# Patient Record
Sex: MALE | Race: BLACK OR AFRICAN AMERICAN | Employment: FULL TIME | URBAN - METROPOLITAN AREA
[De-identification: names, ages, dates, MRNs, and addresses within clinical notes are randomized per-mention and may not be internally consistent; named-entity substitution may affect disease eponyms.]

---

## 2022-09-21 ENCOUNTER — HOSPITAL ENCOUNTER (OUTPATIENT)
Dept: PHYSICAL THERAPY | Age: 57
Discharge: HOME | End: 2022-09-21
Payer: COMMERCIAL

## 2022-09-21 PROCEDURE — 97161 PT EVAL LOW COMPLEX 20 MIN: CPT

## 2022-09-21 NOTE — PROGRESS NOTES
[address] [facility] PHYSICAL THERAPY AT [place]  [address] [facility] [address] [facility], [place], [address] [facility] - Phone: [phone]  Fax: [phone] / [address] [facility]  Patient Name: Elbert Junior : 1965   Medical   Diagnosis: Other low back pain [M54.59] Treatment Diagnosis: LBP;  Lx radic   Onset Date: 2022     Referral Source: Luz Ramirez, DO Start of Care [facility]): 2022   Prior Hospitalization: See medical history Provider #: [id number]   Prior Level of Function: No back pain with ADLs/ work activity   Comorbidities: Diabetes, heart disease, HTN   Medications: Verified on Patient Summary List   The Plan of Care and following information is based on the information from the initial evaluation.   ================================================================  Assessment / key information: Elbert Junior is a 56 y.o.  yo male with Dx of Other low back pain [M54.59]/ Left Lx radiculopathy. He reports waking 1 morning in 2022 with significant L buttock/ hip pain that radiated to the left calf. He sought medical consultation and was provided a steroid dose pack which helped. Current symptoms are described as constant achiness from the left buttock to the left calf. Pain is worse ant night and in the morning. On assessment, Mr. Jacquelin Contreras sits with slouched posture. Lx ROM is WNLs except R rotation with is 75% and painful. LE strength and ROM are WNLs except thighness in B hamstrings and limited L hip ER. SLR test is positive on the left. Repeated movement tests suggests directional preference for extension with centralization of radicular symptoms.   FOTO score= 44%.  ================================================================  Eval Complexity: History LOW Complexity : Zero comorbidities / personal factors that will impact the outcome / POC;  Examination  HIGH Complexity : 4+ Standardized tests and measures addressing body structure, function, activity limitation and / or participation in recreation ; Presentation MEDIUM Complexity : Evolving with changing characteristics ; Decision Making MEDIUM Complexity : FOTO score of 26-74; Overall Complexity LOW   Problem List: pain affecting function, decrease ROM, impaired gait/ balance, decrease ADL/ functional abilitiies, decrease activity tolerance, and decrease flexibility/ joint mobility   Treatment Plan may include any combination of the following: Therapeutic exercise, Therapeutic activities, Neuromuscular re-education, Physical agent/modality, Gait/balance training, Manual therapy, and Patient education  Patient / Family readiness to learn indicated by: asking questions, trying to perform skills, and interest  Persons(s) to be included in education: patient (P)  Barriers to Learning/Limitations: None  Measures taken, if barriers to learning:    Patient Goal (s): Get rid of pain   Patient self reported health status: fair  Rehabilitation Potential: good  Short Term Goals: To be accomplished in  2  weeks:  1 Patient will report >= 25% improvement in symptoms with ADLs. 2 Patient will be educated in extension progression principles to alleviate symptoms. 3 Patient will report centralization of symptoms. Long Term Goals: To be accomplished in  4-6  weeks:  1 Patient to report >= 70% improvement in symptoms with ADLs. 2 Patient will be independent with finalized HEP/ self maintenance. 3 Increase FOTO score >=  59% to indicate improved function with use of LS.  4 Fully abolish radicular symptoms > 1 week duration. 5 Restore full AROM for improved ADL participation.     Frequency / Duration:   Patient to be seen  2  times per week for 4-6  weeks:  Patient / Caregiver education and instruction: self care, activity modification, and exercises    Therapist Signature: Julita Arthur PT Date: 4/55/2830   Certification Period:  Time: 4:37 PM ===================================================================  I certify that the above Physical Therapy Services are being furnished while the patient is under my care. I agree with the treatment plan and certify that this therapy is necessary. To ensure your patient receives the highest quality care and to avoid disruption in therapy please sign and return this plan of care within 21 days. Per Medicaid guidelines if the plan of care is not received within 21 days the patient's care must be put on hold until signed. Physician Signature:        Date:       Time:        Karan Orantes DO  Please sign and return to [facility] or you may fax the signed copy to [phone]. Thank you.

## 2022-09-21 NOTE — PROGRESS NOTES
PHYSICAL THERAPY - DAILY TREATMENT NOTE     Patient Name: Bailey Lr        Date: 2022  : 1965   YES Patient  Verified  Visit #:   1     Insurance: Payor: Fayetta Hamman / Plan: Desire Vyas / Product Type: HMO /      In time: 400 Out time: 435   Total Treatment Time: 35     Medicare/BCBS Time Tracking (below)   Total Timed Codes (min):  - 1:1 Treatment Time:  -     TREATMENT AREA =  Other low back pain [M54.59]    SUBJECTIVE    Pain Level (on 0 to 10 scale):  5   10   Medication Changes/New allergies or changes in medical history, any new surgeries or procedures?     NO    If yes, update Summary List   Subjective Functional Status/Changes:  []  No changes reported     See eval /POC         OBJECTIVE  Modalities Rationale:     decrease pain to improve patient's ability to return to PLOF      min [] Estim, type/location:                                      []  att     []  unatt     []  w/US     []  w/ice    []  w/heat    min []  Mechanical Traction: type/lbs                   []  pro   []  sup   []  int   []  cont    []  before manual    []  after manual    min []  Ultrasound, settings/location:      min []  Iontophoresis w/ dexamethasone, location:                                               []  take home patch       []  in clinic    min []  Ice     []  Heat    location/position:     min []  Vasopneumatic Device, press/temp:     min []  Other:    [] Skin assessment post-treatment (if applicable):    []  intact    []  redness- no adverse reaction     []redness - adverse reaction:      5/ nc min Therapeutic Exercise:  [x]  See flow sheet   Rationale:      increase ROM and increase strength to improve the patients ability to return to PLOF      min Manual Therapy: Technique:      [] S/DTM []IASTM []PROM [] Passive Stretching   []manual TPR    []Jt manipulation:Gr I [] II []  III [] IV[]  []REIL with manual OP  Treatment Area:     Rationale:      decrease pain, increase ROM, increase tissue extensibility and decrease trigger points to improve patient's ability to return to PLOF     min Neuromuscular Re-ed: [x]  See flow sheet   Rationale:      improve coordination, improve balance, increase proprioception and dec dizziness to improve the patients ability to return to PLOF       5/ nc min Self Care: Ext principle ed   Rationale:    increase ROM, increase strength and improve coordination to improve the patients ability to return to PLOF    Billed With/As:   [] TE   [] TA   [] Neuro   [] Self Care Patient Education: [x] Review HEP    [] Progressed/Changed HEP based on:   [] positioning   [] body mechanics   [] transfers   [] heat/ice application    [] other:        Other Objective/Functional Measures:    See eval/ POC     Post Treatment Pain Level (on 0 to 10) scale:   5 (centralization)  / 10     ASSESSMENT    X  See POC     PLAN    [x]  Upgrade activities as tolerated {YES) Continue plan of care   []  Discharge due to :    []  Other:      Therapist: Deni Sung PT    Date: 9/21/2022 Time: 4:35 PM   No future appointments.

## 2022-09-27 ENCOUNTER — TELEPHONE (OUTPATIENT)
Dept: PHYSICAL THERAPY | Age: 57
End: 2022-09-27

## 2022-10-04 ENCOUNTER — HOSPITAL ENCOUNTER (OUTPATIENT)
Dept: PHYSICAL THERAPY | Age: 57
Discharge: HOME | End: 2022-10-04
Payer: COMMERCIAL

## 2022-10-04 PROCEDURE — 97110 THERAPEUTIC EXERCISES: CPT

## 2022-10-04 PROCEDURE — 97112 NEUROMUSCULAR REEDUCATION: CPT

## 2022-10-04 PROCEDURE — 97530 THERAPEUTIC ACTIVITIES: CPT

## 2022-10-04 NOTE — PROGRESS NOTES
PHYSICAL THERAPY - DAILY TREATMENT NOTE     Patient Name: Pete Palencia        Date: 10/4/2022  : 1965   YES Patient  Verified  Visit #:   2   of     Insurance: Payor: Onofre Brown / Plan: Kennedy Gil / Product Type: HMO /      In time: 4:12 Out time: 5: 12 pm   Total Treatment Time: 60     Medicare/[facility] Time Tracking (below)   Total Timed Codes (min):  50 1:1 Treatment Time:  40     TREATMENT AREA =  Other low back pain [M54.59]    SUBJECTIVE    Pain Level (on 0 to 10 scale):  3  / 10_LB, L groin intermittent L shin   Medication Changes/New allergies or changes in medical history, any new surgeries or procedures?     NO    If yes, update Summary List   Subjective Functional Status/Changes:  []  No changes reported     Pain is quite a bit better with the ex  Pt reports he has noticed pain in R LE -intermittently        OBJECTIVE  Modalities Rationale:     decrease edema, decrease inflammation, decrease pain, and increase tissue extensibility to improve patient's ability to return to PLOF   min [] Estim, type/location:                                      []  att     []  unatt     []  w/US     []  w/ice    []  w/heat    min []  Mechanical Traction: type/lbs                   []  pro   []  sup   []  int   []  cont    []  before manual    []  after manual    min []  Ultrasound, settings/location:      min []  Iontophoresis w/ dexamethasone, location:                                               []  take home patch       []  in clinic   10 min [x]  Ice     []  Heat    location/position: Prone lying LB    min []  Vasopneumatic Device, press/temp:    If using vaso (only need to measure limb vaso being performed on)      pre-treatment girth :       post-treatment girth :       measured at (landmark location) :      min []  Other:    [x] Skin assessment post-treatment (if applicable):    [x]  intact    []  redness- no adverse reaction                  []redness - adverse reaction:      15 min Therapeutic Exercise:  [x]  See flow sheet   Rationale:      increase ROM, increase strength, improve coordination, and increase proprioception to improve the patients ability to Return to PLOF     20 min Therapeutic Activity: [x]  Pt education in sitting positioning review with use of towel roll, sleeping positioning, sit to stand, activity modification and self monitoring of sx for pain management; self monitoring of centralization vs radicular sx in ADLs   Rationale:      increase ROM, increase strength, improve coordination, and increase proprioception to improve the patients ability to return to PLOF     15 min Neuromuscular Re-ed: [x]  See flow sheet   Rationale:      increase ROM, increase strength, improve coordination, and increase proprioception to improve the patients ability to return to PLOF     Billed With/As:   [] TE   [] TA   [] Neuro   [] Self Care Patient Education: [x] Review HEP    [] Progressed/Changed HEP based on:   [] positioning   [] body mechanics   [] transfers   [] heat/ice application    [] other:        Other Objective/Functional Measures:    Updated written HEP     Post Treatment Pain Level (on 0 to 10) scale:   0  / 10     ASSESSMENT    Assessment/Changes in Function:     Pt able to abolish sx in standing with extension based positioning and exercise  Moderate tightness L hip ER     []  See Progress Note/Recertification   Patient will continue to benefit from skilled PT services to modify and progress therapeutic interventions, address functional mobility deficits, address ROM deficits, address strength deficits, analyze and address soft tissue restrictions, analyze and cue movement patterns, analyze and modify body mechanics/ergonomics, assess and modify postural abnormalities, and instruct in home and community integration to attain remaining goals.       Progress toward goals / Updated goals:    Good Progress to    [x] STG    [] LTG  good pain relief with current HEP       PLAN    [x] Upgrade activities as tolerated YES Continue plan of care   []  Discharge due to :    []  Other:      Therapist: Carmen Castillo PTA    Date: 10/4/2022 Time: 5:12PM     Future Appointments   Date Time Provider Lisa Means   10/6/2022  5:10 PM Yamilet Buenrostro PT [facility]

## 2022-10-06 ENCOUNTER — HOSPITAL ENCOUNTER (OUTPATIENT)
Dept: PHYSICAL THERAPY | Age: 57
Discharge: HOME | End: 2022-10-06
Payer: COMMERCIAL

## 2022-10-06 PROCEDURE — 97112 NEUROMUSCULAR REEDUCATION: CPT

## 2022-10-06 PROCEDURE — 97110 THERAPEUTIC EXERCISES: CPT

## 2022-10-06 PROCEDURE — 97530 THERAPEUTIC ACTIVITIES: CPT

## 2022-10-06 NOTE — PROGRESS NOTES
PHYSICAL THERAPY - DAILY TREATMENT NOTE     Patient Name: Martínez Benites        Date: 10/6/2022  : 1965   YES Patient  Verified  Visit #:   3   of     Insurance: Payor: Ajay Pradhan / Plan: Bizible / Product Type: HMO /      In time: 510 Out time: 550   Total Treatment Time: 40     Medicare/[facility] Time Tracking (below)   Total Timed Codes (min):  40 1:1 Treatment Time:  40     TREATMENT AREA =  Other low back pain [M54.59]    SUBJECTIVE    Pain Level (on 0 to 10 scale):  2  / 10   Medication Changes/New allergies or changes in medical history, any new surgeries or procedures? NO    If yes, update Summary List   Subjective Functional Status/Changes:  []  No changes reported     Doing better. Ex's help    Had incident where pain went down leg this morning in bathroom. Current pain in L LB and L inner thigh. Pain not sharp anymore, more achy         OBJECTIVE      10 min Therapeutic Exercise:  [x]  See flow sheet   Rationale:      increase ROM and increase strength to improve the patients ability to perform ADLs with less pain     15 min Neuromuscular Re-ed: [x]  See flow sheet   Rationale:      improve coordination and dec neural compromise  to improve the patients ability to perform ADLs with less pain     15 min Therapeutic Activity: [x]  See flow sheet   Rationale:      improve coordination and improve posture, improve body mechanics  to improve the patients ability to perform ADLs with less pain       Billed With/As:   [x] TE   [x] TA   [x] Neuro   [] Self Care Patient Education: [x] Review HEP    [] Progressed/Changed HEP based on:   [] positioning   [] body mechanics   [] transfers   [] heat/ice application    [] other:        Other Objective/Functional Measures:    Sign ed on HEP frequency.  (Currently performing 2x/day)    Able to abolish pain after REIL    Pt ed on extensin principle relative to disc pathology     Pt ed on posture, ADLs- sittinf, sit to stand, lift relative to disc pathology   Post Treatment Pain Level (on 0 to 10) scale:    0/10     ASSESSMENT    Assessment/Changes in Function:     Pt reports iproved ease of donning shoes/ socks     []  See Progress Note/Recertification   Patient will continue to benefit from skilled PT services to modify and progress therapeutic interventions, address functional mobility deficits, address ROM deficits, address strength deficits, analyze and address soft tissue restrictions, analyze and cue movement patterns, and analyze and modify body mechanics/ergonomics to attain remaining goals.       Progress toward goals / Updated goals:    Good Progress to    [] STG    [] LTG  1 as shown by pt report    Fair progress towards LTG 2 due to limited freq of performing HEP       []  See Progress Note/Recertification    PLAN    [x]  Upgrade activities as tolerated {YES) Continue plan of care   []  Discharge due to :    []  Other:      Therapist: Mary Anne Ayala PT    Date: 10/6/2022 Time: 5:09 PM     Future Appointments   Date Time Provider Lisa Means   10/6/2022  5:10 PM Bettie Cook, PT [facility]

## 2022-10-11 ENCOUNTER — HOSPITAL ENCOUNTER (OUTPATIENT)
Dept: PHYSICAL THERAPY | Age: 57
Discharge: HOME | End: 2022-10-11
Payer: COMMERCIAL

## 2022-10-11 PROCEDURE — 97530 THERAPEUTIC ACTIVITIES: CPT

## 2022-10-11 PROCEDURE — 97110 THERAPEUTIC EXERCISES: CPT

## 2022-10-11 NOTE — PROGRESS NOTES
PHYSICAL THERAPY - DAILY TREATMENT NOTE     Patient Name: Renetta Harrison        Date: 10/11/2022  : 1965   YES Patient  Verified  Visit #:     Insurance: Payor: Chase Deng / Plan: Favian Upton / Product Type: HMO /      In time: 4:10 pm Out time: 4:52   Total Treatment Time: 42     Medicare/BCBS Time Tracking (below)   Total Timed Codes (min):  32 1:1 Treatment Time:  32     TREATMENT AREA =  Other low back pain [M54.59]    SUBJECTIVE    Pain Level (on 0 to 10 scale):  1  / 10   Medication Changes/New allergies or changes in medical history, any new surgeries or procedures? NO    If yes, update Summary List   Subjective Functional Status/Changes:  []  No changes reported     Pt reports work is challenging because he has been trying to watch how he if lifting.    Decreasing frequency in LLE radicular sx to 2x/day         OBJECTIVE  Modalities Rationale:     decrease edema, decrease inflammation, decrease pain, and increase tissue extensibility to improve patient's ability to perform lifting with less pain   min [] Estim, type/location:                                      []  att     []  unatt     []  w/US     []  w/ice    []  w/heat    min []  Mechanical Traction: type/lbs                   []  pro   []  sup   []  int   []  cont    []  before manual    []  after manual    min []  Ultrasound, settings/location:      min []  Iontophoresis w/ dexamethasone, location:                                               []  take home patch       []  in clinic   10 min [x]  Ice     []  Heat    location/position: Prone low back    min []  Vasopneumatic Device, press/temp:    If using vaso (only need to measure limb vaso being performed on)      pre-treatment girth :       post-treatment girth :       measured at (landmark location) :      min []  Other:    [x] Skin assessment post-treatment (if applicable):    [x]  intact    []  redness- no adverse reaction                  []redness - adverse reaction:      12 min Therapeutic Exercise:  [x]  See flow sheet   Rationale:      increase ROM, increase strength, improve coordination, and increase proprioception to improve the patients ability to perform lifting with less pain     12 min Therapeutic Activity: [x]  Lifting body mechanics with 10# box waist to floor, floor to shoulder ; sitting hip hinge, sit to stand body mechanics   Rationale:      increase ROM, increase strength, improve coordination, and increase proprioception to improve the patients ability to perform lifting with less pain      8 min Neuromuscular Re-ed: [x]  See flow sheet   Rationale:      increase ROM, increase strength, improve coordination, and increase proprioception to improve the patients ability to perform lifting with less pain          Billed With/As:   [] TE   [] TA   [] Neuro   [] Self Care Patient Education: [x] Review HEP    [] Progressed/Changed HEP based on:   [] positioning   [] body mechanics   [] transfers   [] heat/ice application    [] other:        Other Objective/Functional Measures:    Prone trunk AROM ~ 70%     Post Treatment Pain Level (on 0 to 10) scale:   0  / 10     ASSESSMENT    Assessment/Changes in Function:     Pt demonstrating good use of hip hinge with lifting mechanics with VCs     []  See Progress Note/Recertification   Patient will continue to benefit from skilled PT services to modify and progress therapeutic interventions, address functional mobility deficits, address ROM deficits, address strength deficits, analyze and address soft tissue restrictions, analyze and cue movement patterns, analyze and modify body mechanics/ergonomics, assess and modify postural abnormalities, and instruct in home and community integration to attain remaining goals.       Progress toward goals / Updated goals:    Good Progress to    [x] STG    [] LTG  2 as shown by good centralization of sx with extension based positioning and exercise       PLAN    [x]  Upgrade activities as tolerated YES Continue plan of care   []  Discharge due to :    []  Other:      Therapist: Liat Lamas PTA    Date: 10/11/2022 Time: 4:52 PM     Future Appointments   Date Time Provider Lisa Means   10/13/2022  4:00 PM Elena Shelton PTA [facility]   10/18/2022  4:40 PM Elena Shelton PTA [facility]   10/20/2022  4:40 PM Elena Shelton PTA [facility]

## 2022-10-13 ENCOUNTER — HOSPITAL ENCOUNTER (OUTPATIENT)
Dept: PHYSICAL THERAPY | Age: 57
Discharge: HOME | End: 2022-10-13
Payer: COMMERCIAL

## 2022-10-13 PROCEDURE — 97530 THERAPEUTIC ACTIVITIES: CPT

## 2022-10-13 PROCEDURE — 97112 NEUROMUSCULAR REEDUCATION: CPT

## 2022-10-13 PROCEDURE — 97110 THERAPEUTIC EXERCISES: CPT

## 2022-10-13 NOTE — PROGRESS NOTES
PHYSICAL THERAPY - DAILY TREATMENT NOTE     Patient Name: Candace Trejo        Date: 10/13/2022  : 1965   YES Patient  Verified  Visit #:      12  Insurance: Payor: Jennifer Gaitan / Plan: Linda Yo / Product Type: HMO /      In time: 4:05  Out time: 4:56    Total Treatment Time: 51     Medicare/[employer]BS Time Tracking (below)   Total Timed Codes (min):  41 1:1 Treatment Time:  40     TREATMENT AREA =  Other low back pain [M54.59]    SUBJECTIVE    Pain Level (on 0 to 10 scale):  0  / 10   Medication Changes/New allergies or changes in medical history, any new surgeries or procedures?     NO    If yes, update Summary List   Subjective Functional Status/Changes:  []  No changes reported     Pt reports increased low back pain this morning after driving ~ 30 min into work   No LLE radicular sx ~ 1 week         OBJECTIVE  Modalities Rationale:     decrease inflammation, decrease pain, and increase tissue extensibility to improve patient's ability to perform ADLs with less pain   min [] Estim, type/location:                                      []  att     []  unatt     []  w/US     []  w/ice    []  w/heat    min []  Mechanical Traction: type/lbs                   []  pro   []  sup   []  int   []  cont    []  before manual    []  after manual    min []  Ultrasound, settings/location:      min []  Iontophoresis w/ dexamethasone, location:                                               []  take home patch       []  in clinic   10 min [x]  Ice     []  Heat    location/position: Prone low back    min []  Vasopneumatic Device, press/temp:    If using vaso (only need to measure limb vaso being performed on)      pre-treatment girth :       post-treatment girth :       measured at (landmark location) :      min []  Other:    [] Skin assessment post-treatment (if applicable):    [x]  intact    []  redness- no adverse reaction                  []redness - adverse reaction:      14 min Therapeutic Exercise:  [x]  See flow sheet   Rationale:      increase ROM, increase strength, improve coordination, and increase proprioception to improve the patients ability to perform ADLs with less pain     11 min Therapeutic Activity: [x]  See flow sheet   Rationale:      increase ROM, increase strength, improve coordination, and increase proprioception to improve the patients ability to perform bending , sit to stand, lifting     16 min Neuromuscular Re-ed: [x]  See flow sheet   Rationale:      increase ROM, increase strength, improve coordination, and increase proprioception to improve the patients ability to perform ADLs with less pain         Billed With/As:   [] TE   [] TA   [] Neuro   [] Self Care Patient Education: [x] Review HEP    [] Progressed/Changed HEP based on:   [] positioning   [] body mechanics   [] transfers   [] heat/ice application    [] other:        Other Objective/Functional Measures:    Updated written HEP     Post Treatment Pain Level (on 0 to 10) scale:   0  / 10     ASSESSMENT    Assessment/Changes in Function:     Advanced core stabilization, decreasing muscle guarding with positioning changes  Prone trunk AROM ~80%     []  See Progress Note/Recertification   Patient will continue to benefit from skilled PT services to modify and progress therapeutic interventions, address functional mobility deficits, address ROM deficits, address strength deficits, analyze and address soft tissue restrictions, analyze and cue movement patterns, analyze and modify body mechanics/ergonomics, assess and modify postural abnormalities, and instruct in home and community integration to attain remaining goals.       Progress toward goals / Updated goals:    Good Progress to    [x] STG    [] LTG  1 as shown by good compliance in HEP; good pain relief with current program       PLAN    [x]  Upgrade activities as tolerated YES Continue plan of care   []  Discharge due to :    []  Other:      Therapist: Colten Child PTA    Date: 10/13/2022 Time: 4:56 PM     Future Appointments   Date Time Provider Lisa Means   10/18/2022  4:40 PM Elena Shelton PTA [facility]   10/20/2022  4:40 PM Elena Shelton PTA [facility]

## 2022-10-18 ENCOUNTER — HOSPITAL ENCOUNTER (OUTPATIENT)
Dept: PHYSICAL THERAPY | Age: 57
Discharge: HOME | End: 2022-10-18
Payer: COMMERCIAL

## 2022-10-18 PROCEDURE — 97530 THERAPEUTIC ACTIVITIES: CPT

## 2022-10-18 PROCEDURE — 97110 THERAPEUTIC EXERCISES: CPT

## 2022-10-20 ENCOUNTER — APPOINTMENT (OUTPATIENT)
Dept: PHYSICAL THERAPY | Age: 57
End: 2022-10-20
Payer: COMMERCIAL

## 2022-10-21 ENCOUNTER — APPOINTMENT (OUTPATIENT)
Dept: PHYSICAL THERAPY | Age: 57
End: 2022-10-21
Payer: COMMERCIAL

## 2022-10-24 ENCOUNTER — HOSPITAL ENCOUNTER (OUTPATIENT)
Dept: PHYSICAL THERAPY | Age: 57
Discharge: HOME | End: 2022-10-24
Payer: COMMERCIAL

## 2022-10-24 PROCEDURE — 97530 THERAPEUTIC ACTIVITIES: CPT

## 2022-10-24 PROCEDURE — 97110 THERAPEUTIC EXERCISES: CPT

## 2022-10-24 NOTE — PROGRESS NOTES
[address] [facility] PHYSICAL THERAPY AT [facility] [address]. [place], [address]  Phone: [phone]  Fax: [phone]  PROGRESS NOTE  Patient Name: Hector Street : 1965   Treatment/Medical Diagnosis: Other low back pain [M54.59]   Referral Source: Vidhya Dhaliwal DO     Date of Initial Visit: 22 Attended Visits: 7 Missed Visits: 3     SUMMARY OF TREATMENT  Treatment focused on lumbar extension biased protocol to reduced left sided lumbar radiculopathy. CURRENT STATUS  Patient reporting 50% improvement overall. Significant reduction in radiculopathy over last few week with spinal extension protocol. However, patient fell asleep over weekend in partial-reclined with increased lumbar flexion causing exacerbation of symptoms. Symptoms have since reduced to 3/10. Plan to continue with extension based therex and patient education on avoidance of flexion and prolonged sitting. Patient to follow up with MD on 22 and planning to follow up regarding scheduling of lumbar MRI. Previous Goals:  1. Patient to report >= 70% improvement in symptoms with ADLs. 2. Patient will increase FOTO score >=  59% to indicate improved function with use of LS. 3. Patient will fully abolish radicular symptoms > 1 week duration. 4. Patient will restore full AROM for improved ADL participation. Prior Level/Current Level:  1) Prior Level: n/a    Current Level: 50% improvement   Goal Met? no  2) Prior Level: 44 points   Current Level: 52 points   Goal Met? no  3) Prior Level: daily radiculopathy   Current Level: 50% improvement in symptoms   Goal Met? no  4) Prior Level: no active lumbar flexion   Current Level: no active lumbar flexion   Goal Met? no    New Goals to be achieved in __4__  weeks. Patient will report 75% improvement in left lumbar radiculopathy to improve tolerance to work and ADLs.   Patient will increase FOTO Score to 59 points to improve tolerance [place] says she is still using medihoney on her cat bite, it is down to 1/4 inch. It was still having spots of blood, middle of wound bed was pale pink, then there was a ring of red around the wound, then skin. She skipped the medihoney yesterday and today. Now the wound appears to have a bit of a scab. She thinks the wound looks better and is now questioning if she should stop the medihoney all together, she questions if she should have stopped sooner .    to work and ADLs. Patient will demonstrate independence in HEP to maintain current functional level. RECOMMENDATIONS  Recommend continued PT 2 x week x 4 weeks to further reduce left lumbar radiculopathy. If you have any questions/comments please contact us directly at [phone]. Thank you for allowing us to assist in the care of your patient. Therapist Signature: Oj Hidalgo PT Date: 10/24/2022     Time: 5:02 PM   NOTE TO PHYSICIAN:  PLEASE COMPLETE THE ORDERS BELOW AND FAX TO   [facility]: [phone]. If you are unable to process this request in 24 hours please contact our office: [phone].    ___ I have read the above report and request that my patient continue as recommended.   ___ I have read the above report and request that my patient continue therapy with the following changes/special instructions:_________________________________________________________   ___ I have read the above report and request that my patient be discharged from therapy.      Physician Signature:        Date:       Time:

## 2022-10-24 NOTE — PROGRESS NOTES
PHYSICAL THERAPY - DAILY TREATMENT NOTE  8-    Patient Name: Jackie Robles        Date: 10/24/2022  : 1965   YES Patient  Verified  Visit #:     Insurance: Payor: Patience Cruz / Plan: Julieth Navas / Product Type: HMO /      In time: 4:50 Out time: 5:30   Total Treatment Time: 40     Medicare/BCBS Time Tracking (below)   Total Timed Codes (min):  30 1:1 Treatment Time:  30     TREATMENT AREA =  Other low back pain [M54.59]    SUBJECTIVE    Pain Level (on 0 to 10 scale):  3  / 10   Medication Changes/New allergies or changes in medical history, any new surgeries or procedures?     NO    If yes, update Summary List   Subjective Functional Status/Changes:  []  No changes reported       See PN         OBJECTIVE  Modalities Rationale:     decrease pain to improve patient's ability to work and perform ADLs   min [] Estim, type/location:                                      []  att     []  unatt     []  w/US     []  w/ice    []  w/heat    min []  Mechanical Traction: type/lbs                   []  pro   []  sup   []  int   []  cont    []  before manual    []  after manual    min []  Ultrasound, settings/location:      min []  Iontophoresis w/ dexamethasone, location:                                               []  take home patch       []  in clinic   10 min [x]  Ice     []  Heat    location/position: Prone lumbar    min []  Vasopneumatic Device, press/temp:        min []  Other:    [x] Skin assessment post-treatment (if applicable):    [x]  intact    []  redness- no adverse reaction                  []redness - adverse reaction:      15 min Therapeutic Exercise:  [x]  See flow sheet   Rationale:       centralize symptoms  to improve the patients ability to perform work and ADLs     10 min Therapeutic Activity: [x]  Floor to waist lifting body mechanics; BET sit to stand, hip hinge, use of towel roll for neutral spine posture   Rationale:      increase ROM, increase strength, improve coordination, and increase proprioception to improve the patients ability to  perform lifting with less pain      5 min Manual Therapy: Technique:      [] S/DTM []IASTM []PROM [] Passive Stretching   []manual TPR    []Jt manipulation:Gr I [] II []  III [] IV[] V[]  Treatment Area:  STM lumbar and left piriformis; overpressure with lumbar repeated extension in prone   Rationale:      decrease pain and centralize symptoms  to improve patient's ability to perform work and ADLs    Billed With/As:   [x] TE   [] TA   [] Neuro   [] Self Care Patient Education: [x] Review HEP    [] Progressed/Changed HEP based on:   [] positioning   [] body mechanics   [] transfers   [] heat/ice application    [] other:      Other Objective/Functional Measures:    See PN     Post Treatment Pain Level (on 0 to 10) scale:   1  / 10     ASSESSMENT    Assessment/Changes in Function:     See PN     []  See Progress Note/Recertification   Patient will continue to benefit from skilled PT services to modify and progress therapeutic interventions, address functional mobility deficits, address ROM deficits, address strength deficits, analyze and address soft tissue restrictions, analyze and cue movement patterns, analyze and modify body mechanics/ergonomics, and assess and modify postural abnormalities to attain remaining goals. to attain remaining goals. Progress toward goals / Updated goals:    See PN     PLAN    [x]  Upgrade activities as tolerated YES Continue plan of care   []  Discharge due to :    []  Other:      Therapist: Brian Henry, PT    Date: 10/24/2022 Time: 5:02 PM   No future appointments.

## 2022-10-25 ENCOUNTER — HOSPITAL ENCOUNTER (OUTPATIENT)
Dept: PHYSICAL THERAPY | Age: 57
Discharge: HOME | End: 2022-10-25
Payer: COMMERCIAL

## 2022-10-25 PROCEDURE — 97530 THERAPEUTIC ACTIVITIES: CPT

## 2022-10-25 PROCEDURE — 97110 THERAPEUTIC EXERCISES: CPT

## 2022-10-25 NOTE — PROGRESS NOTES
PHYSICAL THERAPY - DAILY TREATMENT NOTE     Patient Name: Olegario Gupta        Date: 10/25/2022  : 1965   YES Patient  Verified  Visit #:   8   of   12  Insurance: Payor: Marylin Perea / Plan: [employer] [place] / Product Type: HMO /      In time: 4:50 pm Out time: 5:50 pm   Total Treatment Time: 60     Medicare/[facility] Time Tracking (below)   Total Timed Codes (min):  50  1:1 Treatment Time:  23     TREATMENT AREA =  Other low back pain [M54.59]    SUBJECTIVE    Pain Level (on 0 to 10 scale):  2  / 10   Medication Changes/New allergies or changes in medical history, any new surgeries or procedures? NO    If yes, update Summary List   Subjective Functional Status/Changes:  []  No changes reported     Pt reports no radicular sx . Feeling better since over the weekend when he fell asleep sitting up.  Pt reports intermittent cramping in feet   Pain relief with HEP         OBJECTIVE  Modalities Rationale:     decrease edema, decrease inflammation, decrease pain, and increase tissue extensibility to improve patient's ability to perform ADLs with less pain   min [] Estim, type/location:                                      []  att     []  unatt     []  w/US     []  w/ice    []  w/heat    min []  Mechanical Traction: type/lbs                   []  pro   []  sup   []  int   []  cont    []  before manual    []  after manual    min []  Ultrasound, settings/location:      min []  Iontophoresis w/ dexamethasone, location:                                               []  take home patch       []  in clinic   10 min [x]  Ice     []  Heat    location/position: Prone low back    min []  Vasopneumatic Device, press/temp:    If using vaso (only need to measure limb vaso being performed on)      pre-treatment girth :       post-treatment girth :       measured at (landmark location) :      min []  Other:    [x] Skin assessment post-treatment (if applicable):    [x]  intact    []  redness- no adverse reaction []redness - adverse reaction:      24 min Therapeutic Exercise:  [x]  See flow sheet   Rationale:      increase ROM, increase strength, improve coordination, and increase proprioception to improve the patients ability to perform ADLs with less pain     12 min Therapeutic Activity: [x]  See flow sheet   Rationale:      increase ROM, increase strength, improve coordination, and increase proprioception to improve the patients ability to perform ADLs with less pain     9 min Neuromuscular Re-ed: [x]  See flow sheet   Rationale:      increase ROM, increase strength, improve coordination, and increase proprioception to improve the patients ability to perform ADLs with less pain      5 min Manual Therapy: Technique:      Prone DTM to lower lumbar paraspinals, L>R piriformis release  Treatment Area:     Rationale:      decrease pain, increase ROM, and increase tissue extensibility to improve patient's ability to improve tissue mobility in ADLs  The manual therapy interventions were performed at a separate and distinct time from the therapeutic activities interventions. Billed With/As:   [] TE   [] TA   [] Neuro   [] Self Care Patient Education: [x] Review HEP    [] Progressed/Changed HEP based on:   [] positioning   [] body mechanics   [] transfers   [] heat/ice application    [] other:        Other Objective/Functional Measures:     Add tband rows, ext     Post Treatment Pain Level (on 0 to 10) scale:   2  / 10     ASSESSMENT    Assessment/Changes in Function:     Pt reporting intermittent ms cramping in feet/ legs after tband exercises; decreased ex per flow sheet; prone trunk AROM ~ 50 %     []  See Progress Note/Recertification   Patient will continue to benefit from skilled PT services to modify and progress therapeutic interventions, address functional mobility deficits, address ROM deficits, address strength deficits, analyze and address soft tissue restrictions, analyze and cue movement patterns, analyze and modify body mechanics/ergonomics, and assess and modify postural abnormalities to attain remaining goals.       Progress toward goals / Updated goals:    Continue toward all updated goals       PLAN    [x]  Upgrade activities as tolerated YES Continue plan of care   []  Discharge due to :    []  Other:      Therapist: Cinthia Cuello PTA    Date: 10/25/2022 Time: 5:50 PM     Future Appointments   Date Time Provider Lisa Means   10/26/2022  4:45 PM [facility]   11/1/2022  4:00 PM Magdy Messer PTA [facility]   11/2/2022  4:45 PM Jenn Gilbert PT [facility]

## 2022-10-26 ENCOUNTER — HOSPITAL ENCOUNTER (OUTPATIENT)
Dept: PHYSICAL THERAPY | Age: 57
Discharge: HOME | End: 2022-10-26
Payer: COMMERCIAL

## 2022-10-26 PROCEDURE — 97110 THERAPEUTIC EXERCISES: CPT

## 2022-10-26 PROCEDURE — 97530 THERAPEUTIC ACTIVITIES: CPT

## 2022-10-26 PROCEDURE — 97140 MANUAL THERAPY 1/> REGIONS: CPT

## 2022-10-26 NOTE — PROGRESS NOTES
PHYSICAL THERAPY - DAILY TREATMENT NOTE    Patient Name: Rene Miles        Date: 10/26/2022  : 1965   yes Patient  Verified  Visit #:     Insurance: Payor: Rachael Tadeo / Plan: Hafsa Hernández / Product Type: HMO /      In time: 450 Out time: 540   Total Treatment Time: 50     Medicare/BCBS Time Tracking (below)   Total Timed Codes (min):  40 1:1 Treatment Time:  40     TREATMENT AREA =  Other low back pain [M54.59]    SUBJECTIVE  Pain Level (on 0 to 10 scale):  2  / 10 (feels tired)    Medication Changes/New allergies or changes in medical history, any new surgeries or procedures?    no  If yes, update Summary List   Subjective Functional Status/Changes:  []  No changes reported     Not doing too bad today, back just feels a little tired. No radicular sx today and denies cramping into the feet today.            OBJECTIVE  Modalities Rationale:     decrease edema, decrease inflammation, decrease pain, and increase tissue extensibility to improve patient's ability to perform ADLs with less pain                min [] Estim, type/location:                                                            []  att     []  unatt     []  w/US     []  w/ice    []  w/heat     min []  Mechanical Traction: type/lbs                    []  pro   []  sup   []  int   []  cont    []  before manual    []  after manual     min []  Ultrasound, settings/location:        min []  Iontophoresis w/ dexamethasone, location:                                                []  take home patch       []  in clinic   10 min [x]  Ice     []  Heat    location/position: Prone low back     min []  Vasopneumatic Device, press/temp:     If using vaso (only need to measure limb vaso being performed on)      pre-treatment girth :       post-treatment girth :       measured at (landmark location) :       min []  Other:     [x] Skin assessment post-treatment (if applicable):    [x]  intact    []  redness- no adverse reaction []redness - adverse reaction:      17 min Therapeutic Exercise:  [x]  See flow sheet   Rationale:      increase ROM, increase strength, improve coordination, and increase proprioception to improve the patients ability to perform ADLs with less pain      15 min Therapeutic Activity: [x]  See flow sheet   Rationale:      increase ROM, increase strength, improve coordination, and increase proprioception to improve the patients ability to perform ADLs with less pain      8 min Manual Therapy: Technique:      Prone DTM to lower lumbar paraspinals, L>R piriformis release  Treatment Area:     Rationale:      decrease pain, increase ROM, and increase tissue extensibility to improve patient's ability to improve tissue mobility in ADLs  The manual therapy interventions were performed at a separate and distinct time from the therapeutic activities interventions. Billed With/As:   [x] TE   [] TA   [] Neuro   [] Self Care Patient Education: [x] Review HEP    [] Progressed/Changed HEP based on:   [] positioning   [] body mechanics   [] transfers   [] heat/ice application    [] other:      Other Objective/Functional Measures:    Continued with therex, functional activities and NMR per flow sheet    Denies cramping into LE today     No pain post treatment      Post Treatment Pain Level (on 0 to 10) scale:   0  / 10     ASSESSMENT  Assessment/Changes in Function:     Pt reports no cramping in LE today, cued for TA activation during standing rows and extensions. Decent hinge / dead lift mechanics noted today, slight cueing to keep weight close to body. Will progress as able.       []  See Progress Note/Recertification   Patient will continue to benefit from skilled PT services to modify and progress therapeutic interventions, address functional mobility deficits, address ROM deficits, address strength deficits, analyze and address soft tissue restrictions, analyze and cue movement patterns, analyze and modify body mechanics/ergonomics, and assess and modify postural abnormalities to attain remaining goals.    Progress toward goals / Updated goals:    Continue toward all updated goals        PLAN  [x]  Upgrade activities as tolerated yes Continue plan of care   []  Discharge due to :    []  Other:      Therapist: Brian Kim PT    Date: 10/26/2022 Time: 4:45 PM     Future Appointments   Date Time Provider Lisa Means   11/1/2022  4:00 PM Kim Pepe, PTA [facility]   11/2/2022  4:45 PM Magui Seymour, PT [facility]

## 2022-11-01 ENCOUNTER — HOSPITAL ENCOUNTER (OUTPATIENT)
Dept: PHYSICAL THERAPY | Age: 57
Discharge: HOME | End: 2022-11-01
Payer: COMMERCIAL

## 2022-11-01 PROCEDURE — 97110 THERAPEUTIC EXERCISES: CPT

## 2022-11-01 PROCEDURE — 97530 THERAPEUTIC ACTIVITIES: CPT

## 2022-11-01 PROCEDURE — 97140 MANUAL THERAPY 1/> REGIONS: CPT

## 2022-11-01 NOTE — PROGRESS NOTES
PHYSICAL THERAPY - DAILY TREATMENT NOTE     Patient Name: Gregorio aSlas        Date: 2022  : 1965   YES Patient  Verified  Visit #:   10   of   12  Insurance: Payor: Philly Ace / Plan: Silvia Stack / Product Type: HMO /      In time: 4:07 pm Out time: 5:18   Total Treatment Time: 71     Medicare/BCBS Time Tracking (below)   Total Timed Codes (min):  56 1:1 Treatment Time:  38     TREATMENT AREA =  Other low back pain [M54.59]    SUBJECTIVE    Pain Level (on 0 to 10 scale): 2-3  / 10-LB   Medication Changes/New allergies or changes in medical history, any new surgeries or procedures?     NO    If yes, update Summary List   Subjective Functional Status/Changes:  []  No changes reported     Pt reports pain in am and after work  Pt reporting ~80% overall improvement         OBJECTIVE  Modalities Rationale:     decrease edema, decrease inflammation, decrease pain, and increase tissue extensibility to improve patient's ability to perform lifting and bending   min [] Estim, type/location:                                      []  att     []  unatt     []  w/US     []  w/ice    []  w/heat    min []  Mechanical Traction: type/lbs                   []  pro   []  sup   []  int   []  cont    []  before manual    []  after manual    min []  Ultrasound, settings/location:      min []  Iontophoresis w/ dexamethasone, location:                                               []  take home patch       []  in clinic   10 min [x]  Ice     []  Heat    location/position: Prone low back    min []  Vasopneumatic Device, press/temp:    If using vaso (only need to measure limb vaso being performed on)      pre-treatment girth :       post-treatment girth :       measured at (landmark location) :      min []  Other:    [x] Skin assessment post-treatment (if applicable):    [x]  intact    []  redness- no adverse reaction                  []redness - adverse reaction:      31 min Therapeutic Exercise:  [x]  See flow sheet Rationale:      increase ROM, increase strength, improve coordination, and increase proprioception to improve the patients ability to  perform lifting and bending     10 min Therapeutic Activity: [x]  See flow sheet   Rationale:      increase ROM, increase strength, improve coordination, and increase proprioception to improve the patients ability to  perform lifting and bending       16 min Manual Therapy: Technique:      [x] S/DTM []IASTM []PROM [] Passive Stretching   [x]manual TPR    []Jt manipulation:Gr I [] II []  III [] IV[] V[]  Treatment Area:  prone Lumbar; piriformis   Rationale:      decrease pain, increase ROM, increase tissue extensibility, and decrease trigger points to improve patient's ability to improve tissue mobility in ADLs  The manual therapy interventions were performed at a separate and distinct time from the therapeutic activities interventions. Billed With/As:   [] TE   [] TA   [] Neuro   [] Self Care Patient Education: [x] Review HEP    [] Progressed/Changed HEP based on:   [] positioning   [] body mechanics   [] transfers   [] heat/ice application    [] other:        Other Objective/Functional Measures:    Review lifting body mechanics     Post Treatment Pain Level (on 0 to 10) scale:   1  / 10     ASSESSMENT    Assessment/Changes in Function:     Improving hip AROM ER L>R  Emphasized self stretching, positioning in HEP.  Review proper core engeagment with use of deep breathing for abdominal  isolation  Pt education in use of hip hinge in 10# box lifting from  knee to waist; waist to shoulder     []  See Progress Note/Recertification   Patient will continue to benefit from skilled PT services to modify and progress therapeutic interventions, address functional mobility deficits, address ROM deficits, address strength deficits, analyze and address soft tissue restrictions, analyze and cue movement patterns, analyze and modify body mechanics/ergonomics, assess and modify postural abnormalities, and instruct in home and community integration to attain remaining goals.       Progress toward goals / Updated goals:    Good Progress to    [] STG    [x] LTG  2 as shown by improving use of hip hinge with sit to stand , lifting, and tap backs with VCs       PLAN    [x]  Upgrade activities as tolerated YES Continue plan of care   []  Discharge due to :    []  Other:      Therapist: MARTY Culver, PT, DPT, Jez 64    Date: 11/1/2022 Time:  5:18 PM     Future Appointments   Date Time Provider Lisa Means   11/2/2022  4:45 PM Jes Blanton PT [facility]

## 2022-11-02 ENCOUNTER — APPOINTMENT (OUTPATIENT)
Dept: PHYSICAL THERAPY | Age: 57
End: 2022-11-02
Payer: COMMERCIAL

## 2022-11-08 ENCOUNTER — HOSPITAL ENCOUNTER (OUTPATIENT)
Dept: PHYSICAL THERAPY | Age: 57
Discharge: HOME | End: 2022-11-08
Payer: COMMERCIAL

## 2022-11-08 PROCEDURE — 97530 THERAPEUTIC ACTIVITIES: CPT

## 2022-11-08 PROCEDURE — 97110 THERAPEUTIC EXERCISES: CPT

## 2022-11-08 NOTE — PROGRESS NOTES
PHYSICAL THERAPY - DAILY TREATMENT NOTE     Patient Name: Hector Street        Date: 2022  : 1965   YES Patient  Verified  Visit #:     Insurance: Payor: Jennifer Trotter / Plan: Srinivas Reddy / Product Type: HMO /      In time: 6:00 pm Out time: 6:56 pm   Total Treatment Time: 56     Medicare/BCBS Time Tracking (below)   Total Timed Codes (min):  46 1:1 Treatment Time:  38     TREATMENT AREA =  Other low back pain [M54.59]    SUBJECTIVE    Pain Level (on 0 to 10 scale):  3  / 10- low back   Medication Changes/New allergies or changes in medical history, any new surgeries or procedures?     NO    If yes, update Summary List   Subjective Functional Status/Changes:  []  No changes reported       No radicular pain ~ 2 weeks  Pt reporting ~ 80% overall improvement  Pain average: 2-3         OBJECTIVE  Modalities Rationale:     decrease edema, decrease inflammation, decrease pain, and increase tissue extensibility to improve patient's ability to perform ADLs with less pain   min [] Estim, type/location:                                      []  att     []  unatt     []  w/US     []  w/ice    []  w/heat    min []  Mechanical Traction: type/lbs                   []  pro   []  sup   []  int   []  cont    []  before manual    []  after manual    min []  Ultrasound, settings/location:      min []  Iontophoresis w/ dexamethasone, location:                                               []  take home patch       []  in clinic   10 min [x]  Ice     []  Heat    location/position: Prone low back    min []  Vasopneumatic Device, press/temp:    If using vaso (only need to measure limb vaso being performed on)      pre-treatment girth :       post-treatment girth :       measured at (landmark location) :      min []  Other:    [x] Skin assessment post-treatment (if applicable):    [x]  intact    []  redness- no adverse reaction                  []redness - adverse reaction:      36 min Therapeutic Exercise: [x]  See flow sheet   Rationale:      increase ROM and improve coordination to improve the patients ability to perform ADLs with less pain     10 min Therapeutic Activity: [x]  See flow sheet   Rationale:      increase ROM, increase strength, improve coordination, improve balance, and increase proprioception to improve the patients ability to perform lifting , pushing , pulling         Billed With/As:   [] TE   [] TA   [] Neuro   [] Self Care Patient Education: [x] Review HEP    [] Progressed/Changed HEP based on:   [] positioning   [] body mechanics   [] transfers   [] heat/ice application    [] other:        Other Objective/Functional Measures:    Discussed discharge planning     Post Treatment Pain Level (on 0 to 10) scale:   0  / 10     ASSESSMENT    Assessment/Changes in Function:     Pt education in push pull body mechanics with hip sled  Prone trunk AROM: ~ 65%      []  See Progress Note/Recertification   Patient will continue to benefit from skilled PT services to modify and progress therapeutic interventions, address functional mobility deficits, address ROM deficits, address strength deficits, analyze and address soft tissue restrictions, analyze and cue movement patterns, analyze and modify body mechanics/ergonomics, assess and modify postural abnormalities, and instruct in home and community integration to attain remaining goals.       Progress toward goals / Updated goals:    Good Progress to    [] STG    [x] LTG  reassess for possible discharge to HEP       PLAN    [x]  Upgrade activities as tolerated YES Continue plan of care   []  Discharge due to :    []  Other:      Therapist: Chau Tejada PTA    Date: 11/8/2022 Time: 6:56 PM     Future Appointments   Date Time Provider Lisa Means   11/15/2022  4:00 PM Sunni Lira PTA [facility]

## 2022-11-15 ENCOUNTER — HOSPITAL ENCOUNTER (OUTPATIENT)
Dept: PHYSICAL THERAPY | Age: 57
Discharge: HOME | End: 2022-11-15
Payer: COMMERCIAL

## 2022-11-15 PROCEDURE — 97110 THERAPEUTIC EXERCISES: CPT

## 2022-11-15 PROCEDURE — 97530 THERAPEUTIC ACTIVITIES: CPT

## 2022-11-15 PROCEDURE — 97112 NEUROMUSCULAR REEDUCATION: CPT

## 2022-11-15 NOTE — PROGRESS NOTES
[address] [facility] PHYSICAL THERAPY AT [facility] [address] [place], [address]  Phone: [phone]  Fax: [phone] SUMMARY  Patient Name: Renetta Harrison : 1965   Treatment/Medical Diagnosis: Other low back pain [M54.59]   Referral Source: Sruthi Lion DO     Date of Initial Visit: 2022 Attended Visits: 12 Missed Visits: 3     SUMMARY OF TREATMENT  Physical therapy treatment has consisted of Therapeutic exercise for lumbar stabilzation and extension based exercise, Therapeutic Activity for pt education in body mechanics, positioning, and lifting techniques, Manual therapy, and ice. CURRENT STATUS  Patient has progressed well in Physical Therapy, consistently reporting improving ROM, decreasing pain, and increased functional ability. Pt's current pain range is 0 to 1/10. Functional improvements are no radicular sx L LE ~ 3-4 weeks,   Lumbar AROM: FF: 90% (distal shin), ext: 85%, RSB: 80%; LSB: 75% ; pain free all ranges  FOTO score improved from 44 @ IE to 94 indicating improving functional mobility. Pt reporting +7 GROC a very great deal better. Goal/Measure of Progress Goal Met? 1. Patient will report 75% improvement in left lumbar radiculopathy to improve tolerance to work and ADLs. Status at last Eval: 50% improvement Current Status: 90% overall improvement yes   2. Patient will increase FOTO Score to 59 points to improve tolerance to work and ADLs. Status at last Eval: 52  Current Status: 94 yes   3. Patient will demonstrate independence in HEP to maintain current functional level. Status at last Eval: Pt instructed in initial HEP Current Status: Pt I in D/C HEP yes     RECOMMENDATIONS  Discontinue therapy. Progressing towards or have reached established goals. Unless otherwise indicated. If you have any questions/comments please contact us directly at [phone].    Thank you for allowing us to assist in the care of your patient.     Therapist Signature: Alicia Dawson PTA Date: 11-   Reporting Period:   N/a Time: 4:08 PM

## 2022-11-15 NOTE — PROGRESS NOTES
PHYSICAL THERAPY - DAILY TREATMENT NOTE     Patient Name: Amanda Yao        Date: 11/15/2022  : 1965   YES Patient  Verified  Visit #:     Insurance: Payor: Mariana Dixon / Plan: Deondre Chol / Product Type: HMO /      In time: 4:02 pm Out time: 4:46    Total Treatment Time: 44     Medicare/[facility] Time Tracking (below)   Total Timed Codes (min):  44 1:1 Treatment Time:  39     TREATMENT AREA =  Other low back pain [M54.59]    SUBJECTIVE    Pain Level (on 0 to 10 scale):  1  / 10   Medication Changes/New allergies or changes in medical history, any new surgeries or procedures? NO    If yes, update Summary List   Subjective Functional Status/Changes:  []  No changes reported   Pt reports pain range~ 1/10          OBJECTIVE  Modalities Rationale:  n/a       19 min Therapeutic Exercise:  [x]  See flow sheet   Rationale:      increase ROM, increase strength, improve coordination, and increase proprioception to improve the patients ability to perform ADLs with less pain      10 min Therapeutic Activity: [x]  See flow sheet   Rationale:      increase ROM, increase strength, improve coordination, and increase proprioception to improve the patients ability to perform ADLs with less pain      15 min Neuromuscular Re-ed: [x]  See flow sheet   Rationale:      increase ROM, increase strength, improve coordination, and increase proprioception to improve the patients ability to perform ADLs with less pain         Billed With/As:   [] TE   [] TA   [] Neuro   [] Self Care Patient Education: [x] Review HEP    [] Progressed/Changed HEP based on:   [] positioning   [] body mechanics   [] transfers   [] heat/ice application    [] other:        Other Objective/Functional Measures:     Add restorative flexion stretches per flow sheet  Review D/C instructions and HEP     Post Treatment Pain Level (on 0 to 10) scale:   0  / 10     ASSESSMENT    Assessment/Changes in Function:     FOTO: 94     [x]  See Progress Note/Recertification   Patient will continue to benefit from skilled PT services to modify and progress therapeutic interventions, address functional mobility deficits, address ROM deficits, address strength deficits, analyze and address soft tissue restrictions, analyze and cue movement patterns, analyze and modify body mechanics/ergonomics, assess and modify postural abnormalities, and instruct in home and community integration to attain remaining goals. Progress toward goals / Updated goals:  See discharge        PLAN    []  Upgrade activities as tolerated NO Continue plan of care   [x]  Discharge due to : Plan to D/C to HEP unless otherwise indicated by MD @ f/u on 11/21/2022     []  Other:      Therapist: Zoila Carrington PTA    Date: 11/15/2022 Time:  4:46 PM   No future appointments.

## 2024-01-29 ENCOUNTER — HOSPITAL ENCOUNTER (OUTPATIENT)
Facility: HOSPITAL | Age: 59
Setting detail: RECURRING SERIES
Discharge: HOME | End: 2024-02-01
Payer: MEDICAID

## 2024-01-29 PROCEDURE — 97161 PT EVAL LOW COMPLEX 20 MIN: CPT

## 2024-01-29 NOTE — PROGRESS NOTES
PHYSICAL / OCCUPATIONAL THERAPY - DAILY TREATMENT NOTE (updated )    Patient Name: Edgard Adams    Date: 2024    : 1965  Insurance: Payor: [facility] MEDICAID / Plan: [employer]IDIA [facility] HEALTHKEEPERS PLUS / Product Type: *No Product type* /      Patient  verified Yes   Visit #   Current / Total 1 12   Time   In / Out 3:00 3:40   Pain   In / Out 3/10 3/10   Subjective Functional Status/Changes: See Eval     TREATMENT AREA =  Other low back pain [M54.59]    OBJECTIVE         Therapeutic Procedures:  Tx Min Billable or 1:1 Min (if diff from Tx Min) Procedure, Rationale, Specifics   -  42699 Therapeutic Exercise (timed):  increase ROM, strength, coordination, balance, and proprioception to improve patient's ability to progress to PLOF and address remaining functional goals. (see flow sheet as applicable)     Details if applicable:         42412 Neuromuscular Re-Education (timed):  improve balance, coordination, kinesthetic sense, posture, core stability and proprioception to improve patient's ability to develop conscious control of individual muscles and awareness of position of extremities in order to progress to PLOF and address remaining functional goals. (see flow sheet as applicable)     Details if applicable:       68072 Manual Therapy (timed):  decrease pain, increase ROM, and increase tissue extensibility to improve patient's ability to progress to PLOF and address remaining functional goals.  The manual therapy interventions were performed at a separate and distinct time from the therapeutic activities interventions . (see flow sheet as applicable)     Details if applicable:       69177 Therapeutic Activity (timed):  use of dynamic activities replicating functional movements to increase ROM, strength, coordination, balance, and proprioception in order to improve patient's ability to progress to PLOF and address remaining functional goals.  (see flow sheet as applicable)     Details if

## 2024-01-29 NOTE — PROGRESS NOTES
[facility] [place]  [address], [address], [place] [address] - Phone: [phone]  Fax: [phone]  PLAN OF CARE / STATEMENT OF MEDICAL NECESSITY FOR PHYSICAL THERAPY SERVICES  Patient Name: Edgard Adams : 1965   Treatment   Diagnosis: M54.32  SCIATICA, LEFT SIDE  Medical Diagnosis: Other low back pain [M54.59]   Onset Date: 2023     Referral Source: Fidel Miles DO Start of Care (SOC): 2024   Prior Hospitalization: See medical history Provider #: [id number]   Prior Level of Function: Pt has chronic L/S pain with ADLs   Comorbidities: None reported    The Plan of Care and following information is based on the information from the initial evaluation.   ===========================================================================================  Patient is a 58 year old male that presents to PT with chief complaints of low back pain with radiating symptoms down the LLE that began in 2023 with no known mechanism of injury. Pt reports pain as 3-10/10, located in the L L/s and down the L LE. Pain today is rated as 3/10. Describes pain as aching  and intermittent in nature. Heat and meds makes the pain better. Moving the leg makes the pain worse. 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  Their primary goals in PT are to improve pain in order to take part in these activities of daily living.   Prior Diagnostic Tests: [] Lab work [] X-rays    [] CT [x] MRI     [] Other:  Results: do not have access to it currently\"  ==========================================================================  Objective Measures:    Symptoms:  Aggravated by:   [x] Bending [x] Sitting [] Standing [] Walking   [] Moving [] Cough [] Sneeze [] Valsalva   [] AM  [x] PM  Lying:  [] sup   [] pro   [] sidelying   [] Other: lifting         Posture:  Lateral

## 2024-02-02 ENCOUNTER — APPOINTMENT (OUTPATIENT)
Facility: HOSPITAL | Age: 59
End: 2024-02-02
Payer: MEDICAID

## 2024-02-06 ENCOUNTER — APPOINTMENT (OUTPATIENT)
Facility: HOSPITAL | Age: 59
End: 2024-02-06
Payer: MEDICAID

## 2024-02-09 ENCOUNTER — APPOINTMENT (OUTPATIENT)
Facility: HOSPITAL | Age: 59
End: 2024-02-09
Payer: MEDICAID

## 2024-02-13 ENCOUNTER — HOSPITAL ENCOUNTER (OUTPATIENT)
Facility: HOSPITAL | Age: 59
Setting detail: RECURRING SERIES
Discharge: HOME | End: 2024-02-16
Payer: MEDICAID

## 2024-02-13 PROCEDURE — 97110 THERAPEUTIC EXERCISES: CPT

## 2024-02-13 PROCEDURE — 97535 SELF CARE MNGMENT TRAINING: CPT

## 2024-02-13 PROCEDURE — 97112 NEUROMUSCULAR REEDUCATION: CPT

## 2024-02-13 NOTE — PROGRESS NOTES
PHYSICAL / OCCUPATIONAL THERAPY - DAILY TREATMENT NOTE    Patient Name: Edgard Adams    Date: 2024    : 1965  Insurance: Payor: [facility] MEDICAID / Plan: [employer]IDIA [facility] HEALTHKEEPERS PLUS / Product Type: *No Product type* /      Patient  verified Yes     Visit #   Current / Total 2 12   Time   In / Out 2:20 3:19   Pain   In / Out 3 0   Subjective Functional Status/Changes: Reports hasn't had pain into LE since he's been doing his exercises. Pain is in L lower back     TREATMENT AREA =  Other low back pain [M54.59]    OBJECTIVE    Ice (UNBILLED):  location/position: prone, L/s     Min Rationale   10 decrease inflammation and decrease pain to improve patient's ability to progress to PLOF and address remaining functional goals.     Skin assessment post-treatment:   Intact     Therapeutic Procedures:    21687 Therapeutic Exercise (timed):  increase ROM, strength, coordination, balance, and proprioception to improve patient's ability to progress to PLOF and address remaining functional goals.   Tx Min Billable or 1:1 Min   (if diff from Tx Min) Details:   25 15 See flow sheet as applicable     26095 Neuromuscular Re-Education (timed):  improve balance, coordination, kinesthetic sense, posture, core stability and proprioception to improve patient's ability to develop conscious control of individual muscles and awareness of position of extremities in order to progress to PLOF and address remaining functional goals.   Tx Min Billable or 1:1 Min   (if diff from Tx Min) Details:   15  See flow sheet as applicable     34940 Self Care/Home Management (timed):  improve patient knowledge and understanding of pain reducing techniques, positioning, and posture/ergonomics  to improve patient's ability to progress to PLOF and address remaining functional goals.    Tx Min Billable or 1:1 Min   (if diff from Tx Min) Details:   9  Discussion of pt functional status, positioning in truck, mobility        49 39

## 2024-02-15 ENCOUNTER — HOSPITAL ENCOUNTER (OUTPATIENT)
Facility: HOSPITAL | Age: 59
Setting detail: RECURRING SERIES
Discharge: HOME | End: 2024-02-18
Payer: MEDICAID

## 2024-02-15 PROCEDURE — 97112 NEUROMUSCULAR REEDUCATION: CPT

## 2024-02-15 PROCEDURE — 97535 SELF CARE MNGMENT TRAINING: CPT

## 2024-02-15 PROCEDURE — 97110 THERAPEUTIC EXERCISES: CPT

## 2024-02-15 NOTE — PROGRESS NOTES
PHYSICAL / OCCUPATIONAL THERAPY - DAILY TREATMENT NOTE    Patient Name: Edgard Adams    Date: 2/15/2024    : 1965  Insurance: Payor: [facility] MEDICAID / Plan: [employer]ID[employer] [facility] HEALTHKEEPERS PLUS / Product Type: *No Product type* /      Patient  verified Yes     Visit #   Current / Total 3 12   Time   In / Out 4:13 5:08   Pain   In / Out 2 0   Subjective Functional Status/Changes: Reports was feeling tired on both sides of his back earlier this afternoon.      TREATMENT AREA =  Other low back pain [M54.59]    OBJECTIVE    Ice (UNBILLED):  location/position: prone, L/s     Min Rationale   10 decrease inflammation and decrease pain to improve patient's ability to progress to PLOF and address remaining functional goals.     Skin assessment post-treatment:   Intact     Therapeutic Procedures:    76230 Therapeutic Exercise (timed):  increase ROM, strength, coordination, balance, and proprioception to improve patient's ability to progress to PLOF and address remaining functional goals.   Tx Min Billable or 1:1 Min   (if diff from Tx Min) Details:   20  See flow sheet as applicable     28087 Neuromuscular Re-Education (timed):  improve balance, coordination, kinesthetic sense, posture, core stability and proprioception to improve patient's ability to develop conscious control of individual muscles and awareness of position of extremities in order to progress to PLOF and address remaining functional goals.   Tx Min Billable or 1:1 Min   (if diff from Tx Min) Details:   17  See flow sheet as applicable     55260 Self Care/Home Management (timed):  improve patient knowledge and understanding of posture/ergonomics, diagnosis/prognosis, and physical therapy expectations, procedures and progression  to improve patient's ability to progress to PLOF and address remaining functional goals.    Tx Min Billable or 1:1 Min   (if diff from Tx Min) Details:   8  See flow sheet as applicable and ways to increase extension

## 2024-02-16 ENCOUNTER — APPOINTMENT (OUTPATIENT)
Facility: HOSPITAL | Age: 59
End: 2024-02-16
Payer: MEDICAID

## 2024-02-20 ENCOUNTER — HOSPITAL ENCOUNTER (OUTPATIENT)
Facility: HOSPITAL | Age: 59
Setting detail: RECURRING SERIES
Discharge: HOME | End: 2024-02-23
Payer: MEDICAID

## 2024-02-20 PROCEDURE — 97110 THERAPEUTIC EXERCISES: CPT

## 2024-02-20 PROCEDURE — 97535 SELF CARE MNGMENT TRAINING: CPT

## 2024-02-20 PROCEDURE — 97112 NEUROMUSCULAR REEDUCATION: CPT

## 2024-02-20 NOTE — PROGRESS NOTES
PHYSICAL / OCCUPATIONAL THERAPY - DAILY TREATMENT NOTE    Patient Name: Edgard Adams    Date: 2024    : 1965  Insurance: Payor: [facility] MEDICAID / Plan: [employer]IDIA [facility] HEALTHKEEPERS PLUS / Product Type: *No Product type* /      Patient  verified Yes     Visit #   Current / Total 4 8   Time   In / Out 2:23 3:19   Pain   In / Out 3 0   Subjective Functional Status/Changes: Back feels tired, usually gets tired by end of the day. Had stressful weekend. Has possible surgery on Friday for hernia surgery. Feeling much better since beginning PT. Doesn't have pain now or at work, just tired. Not taking any medication for pain.      TREATMENT AREA =  Other low back pain [M54.59]    OBJECTIVE    Ice (UNBILLED):  location/position: prone, L/s     Min Rationale   10 decrease inflammation and decrease pain to improve patient's ability to progress to PLOF and address remaining functional goals.     Skin assessment post-treatment:   Intact     Therapeutic Procedures:    83494 Therapeutic Exercise (timed):  increase ROM, strength, coordination, balance, and proprioception to improve patient's ability to progress to PLOF and address remaining functional goals.   Tx Min Billable or 1:1 Min   (if diff from Tx Min) Details:   20  See flow sheet as applicable     15216 Neuromuscular Re-Education (timed):  improve balance, coordination, kinesthetic sense, posture, core stability and proprioception to improve patient's ability to develop conscious control of individual muscles and awareness of position of extremities in order to progress to PLOF and address remaining functional goals.   Tx Min Billable or 1:1 Min   (if diff from Tx Min) Details:   16  See flow sheet as applicable     21416 Self Care/Home Management (timed):  improve patient knowledge and understanding of positioning, posture/ergonomics, diagnosis/prognosis, and physical therapy expectations, procedures and progression  to improve patient's ability

## 2024-02-23 ENCOUNTER — APPOINTMENT (OUTPATIENT)
Facility: HOSPITAL | Age: 59
End: 2024-02-23
Payer: MEDICAID

## 2024-02-27 ENCOUNTER — HOSPITAL ENCOUNTER (OUTPATIENT)
Facility: HOSPITAL | Age: 59
Setting detail: RECURRING SERIES
Discharge: HOME | End: 2024-03-01
Payer: MEDICAID

## 2024-02-27 PROCEDURE — 97530 THERAPEUTIC ACTIVITIES: CPT

## 2024-02-27 PROCEDURE — 97112 NEUROMUSCULAR REEDUCATION: CPT

## 2024-02-27 PROCEDURE — 97110 THERAPEUTIC EXERCISES: CPT

## 2024-02-27 NOTE — PROGRESS NOTES
PHYSICAL / OCCUPATIONAL THERAPY - DAILY TREATMENT NOTE    Patient Name: Edgrad Adams    Date: 2024    : 1965  Insurance: Payor: [facility] MEDICAID / Plan: NIDIA [facility] HEALTHKEEPERS PLUS / Product Type: *No Product type* /      Patient  verified Yes     Visit #   Current / Total 5 8   Time   In / Out 2:23 3:19   Pain   In / Out 0 0   Subjective Functional Status/Changes: Pt reports 100% improvement since beginning PT. Reports improvement in strength, improvement in energy, less pain, no pain into LEs, reports no issues doing job.  Reports going to be having hernia surgery on Thursday 3/14.      TREATMENT AREA =  Other low back pain [M54.59]    OBJECTIVE    Ice (UNBILLED):  location/position: prone, l/s     Min Rationale   10 decrease inflammation and decrease pain to improve patient's ability to progress to PLOF and address remaining functional goals.     Skin assessment post-treatment:   Intact     Therapeutic Procedures:    78385 Therapeutic Exercise (timed):  increase ROM, strength, coordination, balance, and proprioception to improve patient's ability to progress to PLOF and address remaining functional goals.   Tx Min Billable or 1:1 Min   (if diff from Tx Min) Details:   21 15 See flow sheet as applicable     59394 Neuromuscular Re-Education (timed):  improve balance, coordination, kinesthetic sense, posture, core stability and proprioception to improve patient's ability to develop conscious control of individual muscles and awareness of position of extremities in order to progress to PLOF and address remaining functional goals.   Tx Min Billable or 1:1 Min   (if diff from Tx Min) Details:   15  See flow sheet as applicable     78615 Therapeutic Activity (timed):  use of dynamic activities replicating functional movements to increase ROM, strength, coordination, balance, and proprioception in order to improve patient's ability to progress to PLOF and address remaining functional goals.

## 2024-02-28 NOTE — PROGRESS NOTES
[facility]   [address], [address], [place] [address] Ph: [phone] Fx: [phone]  PHYSICAL THERAPY PROGRESS NOTE  Patient Name: Edgard Adams : 1965   Treatment/Medical Diagnosis: Other low back pain [M54.59]   Referral Source: Fidel Miles DO     Date of Initial Visit: 2024 Attended Visits: 5 Missed Visits: 0     SUMMARY OF TREATMENT  Pt has undergone a bout of physical therapy consisting of therapeutic exercise, therapeutic activities, neuromuscular re-education, modalities, and patient education     CURRENT STATUS  PT has attended 5 PT sessions including initial evaluation on 2024. Since beginning PT, pt reports 100% improvement, reporting improvements in pain level, energy level, and no radicular pain into his LE. 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 Given pt's response to treatment, will discharge to HEP following next session if there is no change in status.     Pt will be independent and compliant with HEP to decrease pain, increase ROM and return pt to PLOF.   Status at last Eval: NA  Current Status: MET, reports compliance with HEP  Goal Met?  yes    2.  Increase score on FOTO to > or = to 60 points to demo an increase in functional activity tolerance with the LE.   Status at last Eval: 54/100  Current Status: 83/100  Goal Met?  yes    3. Pt will demonstrate a GROC score of >/= +5 to show overall improvement in function   Status at last Eval: NA  Current Status: MET, GROC=7  Goal Met?  yes      Payor: [facility] MEDICAID / Plan: [employer]IDIA [facility] HEALTHKEEPERS PLUS / Product Type: *No Product type* /     Non-Medicare, can change goals, can adjust or add frequency duration, no signature required      New Goals to be achieved in __1__ WEEKS  1. Continue unmet goals  2. --  3. --    Frequency / Duration:   Patient to be seen   1   times per